# Patient Record
Sex: MALE | Race: WHITE | Employment: OTHER | URBAN - METROPOLITAN AREA
[De-identification: names, ages, dates, MRNs, and addresses within clinical notes are randomized per-mention and may not be internally consistent; named-entity substitution may affect disease eponyms.]

---

## 2019-05-20 ENCOUNTER — HOSPITAL ENCOUNTER (OUTPATIENT)
Dept: PHYSICAL THERAPY | Age: 74
Setting detail: THERAPIES SERIES
Discharge: HOME | End: 2019-05-20
Payer: MEDICARE

## 2019-05-20 PROCEDURE — 97530 THERAPEUTIC ACTIVITIES: CPT

## 2019-05-20 PROCEDURE — 97162 PT EVAL MOD COMPLEX 30 MIN: CPT

## 2019-05-20 ASSESSMENT — PAIN SCALES - QUEBEC BACK PAIN DISABILITY SCALE
TAKE FOOD OUT OF THE REFRIGERATOR: 1
GET OUT OF BED: 2
REACH UP TO HIGH SHELVES: 4
SIT IN A CHAIR FOR SEVERAL HOURS: 1
MOVE A CHAIR: 4
CLIMB ONE FLIGHT OF STAIRS: 4
QUEBEC DISABILITY INDEX: 40-59%
THROW A BALL: 1
BEND OVER TO CLEAN THE BATHTUB: 4
RIDE IN A CAR: 1
PUT ON SOCKS OR PANYHOSE: 2
WALK SEVERAL KILOMETERS  OR MILES: 4
RUN ONE BLOCK OR 100M: 5
QUEBEC CMS MODIFIER: CK
TURN OVER IN BED: 1
TOTAL SCORE: 53
PULL OR PUSH HEAVY DOORS: 1
CARRY TWO BAGS OF GROCERIES: 2
STAND UP FOR 20 TO 30 MINUTES: 4
LIFT AND CARRY A HEAVY SUITCASE: 4
MAKE YOUR BED: 3
WALK A FEW BLOCKS OR 300 TO 400M: 4
SLEEP THROUGH THE NIGHT: 1

## 2019-05-20 NOTE — PLAN OF CARE
Outpatient Physical Therapy  [x] [facility]    Phone: [phone]   Fax: [phone]   [] [facility]  Phone: [phone]              Fax: [phone]  [] [place]   Phone: [phone]   Fax: [phone]     To: Referring Practitioner: Jerome Garcia MD      Patient: Lilly Richards   : 1945   MRN: [id number]  Evaluation Date: 2019      Diagnosis Information:  ·  Dx - Jah LE pain  · Decreased functional mobility 2/2 bilateral LE pain. ·       Physical Therapy Certification  Dear Dr. Maya Deluna  The following patient has been evaluated for physical therapy services and for therapy to continue, Medicare requires monthly physician review of the treatment plan. Please review the attached evaluation and/or summary of the patient's plan of care, and verify that you agree therapy should continue by signing the attached document and sending it back to our office. Plan of Care/Treatment to date:  [] Therapeutic Exercise    [] Modalities:  [] Therapeutic Activity     [] Ultrasound  [] Electrical Stimulation  [] Gait Training      [] Cervical Traction [] Lumbar Traction  [] Neuromuscular Re-education    [] Cold/hotpack [] Iontophoresis   [] Instruction in HEP     Other:  [] Manual Therapy      []             [] Aquatic Therapy      []           ? Frequency/Duration:  # Days per week: [] 1 day # Weeks: [] 1 week [] 5 weeks     [x] 2 days? [] 2 weeks [] 6 weeks     [] 3 days   [] 3 weeks [] 7 weeks     [] 4 days   [] 4 weeks [x] 8 weeks    Rehab Potential: [] Excellent [x] Good [] Fair  [] Poor       Electronically signed by:  Juvenal Mckeon, PT  [id number]    If you have any questions or concerns, please don't hesitate to call.   Thank you for your referral.      Physician Signature:________________________________Date:__________________  By signing above, therapists plan is approved by physician

## 2019-05-20 NOTE — FLOWSHEET NOTE
Program:  Patient instructed in the following for HEP:   Patient verbalized/demonstrated understanding and was issued written HEP. Timed Code Treatment Minutes:  25    Total Treatment Minutes:  40    Treatment/Activity Tolerance:  [x] Patient tolerated treatment well [] Patient limited by fatigue  [] Patient limited by pain  [] Patient limited by other medical complications  [] Other:     Prognosis: [x] Good [] Fair  [] Poor    Goals:    Short term goals  Time Frame for Short term goals: 4 weeks  Short term goal 1: Progress to 45 min of aquatic therapy      Long term goals  Time Frame for Long term goals : Discharge  Long term goal 1: Decrease maximum pain to 7/10 max to allow increased gait tolerance  Long term goal 2:  Increase tandem balance to 10 sec r/l  to improve functional balance with gait     Patient Requires Follow-up: [x] Yes  [] No    Plan:   [] Continue per plan of care [] Alter current plan (see comments)  [x] Plan of care initiated [] Hold pending MD visit [] Discharge    Plan for Next Session:  Aquatic therapy for balance, endurance, gait    Electronically signed by:  Cleotilde Lombard, [address]

## 2019-05-20 NOTE — PROGRESS NOTES
Physical Therapy  Initial Assessment  Date: 2019  Patient Name: Sree Pagan  MRN: [id number]  : 1945     Treatment Diagnosis: Decreased functional mobilty 2/2 bilateral LE pain    Restrictions  Position Activity Restriction  Other position/activity restrictions: medium fall risk    Subjective   General  Chart Reviewed: Yes  Additional Pertinent Hx: CAD, CHF, COPDdepression,  GERD, HLD, HTN, neuropathy, renal insufficiency, sleep apnea, AAA repair, cardiac defibrillator. Referring Practitioner: Makayla Jaramillo MD  Referral Date : 19  General Comment  Comments: MRI 2017 - degenerative changes in lower lumbar spine, mod degenerative arthrosis of both hips. Multilevel degenerative changes most prominent at L4-5 with severe right foraminal stenosis and compression of right L4 nerve root. Bone scan - degenerative uptake of bilateral shoulders, left knee, left midfoot, sternoclavicular joints. PT Visit Information  Onset Date: 09  PT Insurance Information: Medicare  Subjective  Subjective: History of LBP burning and down both legs. Pain 6-10/10. Pain began after  emergent AAA . Vision/Hearing  Vision  Vision: Within Functional Limits  Hearing  Hearing: Exceptions to [facility]  Hearing Exceptions: Hard of hearing/hearing concerns    Orientation  Orientation  Overall Orientation Status: Within Normal Limits    Social/Functional History  Social/Functional History  Lives With: Spouse  Type of Home: House    Objective     Observation/Palpation  Posture: (no LLD, normal lordosis)  Observation: chronic venous stasis changes  Edema: min LE edema noted - Pt notes he gets 4-5 # of fluid off at each HD treatment.     AROM RLE (degrees)  RLE AROM: WFL  AROM LLE (degrees)  LLE AROM : WFL    Strength RLE  Strength RLE: WFL  Strength LLE  Strength LLE: WFL     Additional Measures  Flexibility: mod limited HS/GS flexibility  Special Tests: negative crams  Sensation  Overall Sensation Status: Impaired(decreased LT imani LE's)       Ambulation  Ambulation?: Yes  Ambulation 1  Quality of Gait: antalgic gait with cane, improved deidre with RW  Balance  Standing - Dynamic: Good;-  Tandem Stance R Le  Tandem Stance L Le  Single Leg Stance R Le  Single Leg Stance L Le       Assessment   Conditions Requiring Skilled Therapeutic Intervention  Body structures, Functions, Activity limitations: Decreased functional mobility ; Decreased balance;Decreased endurance;Decreased sensation  Assessment: Pt with 10 year history of bilateral LE pain since emergent AAA repair. Prior function - continued issues over the past ten years due to pain. Current function - limited gait, ADL  Treatment Diagnosis: Decreased functional mobilty 2/2 bilateral LE pain  Prognosis: Good  Decision Making: Medium Complexity  Patient Education: role of PT  Barriers to Learning: none noted  REQUIRES PT FOLLOW UP: Yes  Treatment Initiated : POC  Activity Tolerance  Activity Tolerance: Patient limited by endurance; Patient limited by pain         Plan   Plan  Times per week: 2x/week x 8 weeks  Current Treatment Recommendations: Aquatics, Home Exercise Program, Endurance Training, Functional Mobility Training      OutComes Score  [place] Total Score: 53    Goals  Short term goals  Time Frame for Short term goals: 4 weeks  Short term goal 1: Progress to 45 min of aquatic therapy  Long term goals  Time Frame for Long term goals : Discharge  Long term goal 1: Decrease maximum pain to 7/10 max to allow increased gait tolerance  Long term goal 2: Increase tandem balance to 10 sec r/l  to improve functional balance with gait  Patient Goals   Patient goals : \"To be able to walk long distance. \"        Timed Code Treatment Minutes:   25    Total Treatment Minutes:  [place], [id number]

## 2019-05-23 ENCOUNTER — HOSPITAL ENCOUNTER (OUTPATIENT)
Dept: PHYSICAL THERAPY | Age: 74
Setting detail: THERAPIES SERIES
Discharge: HOME | End: 2019-05-23
Payer: MEDICARE

## 2019-05-23 NOTE — FLOWSHEET NOTE
Physical Therapy  Cancellation/No-show Note  Patient Name:  Alexys Brito  :  1945   Date:  2019  Cancelled visits to date: 1   No-shows to date: 0    For today's appointment patient:  [x]  Cancelled  []  Rescheduled appointment  []  No-show     Reason given by patient:  []  Patient ill  []  Conflicting appointment  []  No transportation    []  Conflict with work  []  No reason given  []  Other:     Comments:  Initial  aquatic session    Electronically signed by:  Laz Lima, PTA  [id number]

## 2019-05-28 ENCOUNTER — HOSPITAL ENCOUNTER (OUTPATIENT)
Dept: PHYSICAL THERAPY | Age: 74
Setting detail: THERAPIES SERIES
Discharge: HOME | End: 2019-05-28
Payer: MEDICARE

## 2019-05-28 PROCEDURE — 97113 AQUATIC THERAPY/EXERCISES: CPT

## 2019-05-28 NOTE — FLOWSHEET NOTE
AROM Extension     LEs exercises:   AROM Side Bending    Marching  10 AROM Rotation    Heel Raises  10 Chin tucks    Toe Raises  10 Chin nods     Squats  10      Hamstring Curls  10      Hip Flexion  10 Balance: Hip Abduction  10 SLS    Hip Circles  10 Tandem stance    TA set   NBOS eyes open    Glut Set  10 NBOS eyes closed    Hip Extension   Hand to Opposite Knee    Hip Adduction    Box Step     Hip IR   Noodle Stance     Hip ER  Stop/Go Gait    Fig 8's  Switch Gait                Seated:  Functional:    Ankle Pumps  10 Step up forward    Ankle circles  10 Step up lateral    Knee flex & ext  10 Step down    Hip Abd & Adduction  10 [place] squats    Bicycle   10 Crate Lifts    Add Set with ball  10 Lunges forward    LX stab with med ball throws  Lunges lateral    Ankle INV  Lunges retro    Ankle EV  Lower ab curl with noodle      Upper ab curl with ball      Med ball straight lifts      Med ball diagonal lifts      Hydrorider          Noodle:      Leg Press  Deep Water:    Noodle hang at wall  Jog    Noodle hang deep water  Jumping Jacks    Noodle Bicycle  Heel to toe      Hand to opposite knee      Cross country skier      Rocking Horse        Individual Aquatic Timed Minutes:  25    Group Aquatic Timed Minutes:  0  Aquatic group therapy is the presence of more than 1 patient in the pool, at the same time. During that time each patient receives individualized instruction designed for their specific diagnosis.      Treatment/Activity Tolerance:  [x] Patient tolerated treatment well [] Patient limited by fatique  [] Patient limited by pain  [] Patient limited by other medical complications  [x] Other: No change in pain after aquatic session    Prognosis: [] Good [] Fair  [] Poor    Patient Requires Follow-up: [] Yes  [] No    Plan:   [x] Continue per plan of care [] Alter current plan (see comments)  [] Plan of care initiated [] Hold pending MD visit [] Discharge    Plan for Next Session:  With emphasis on stabilization, good posture and exercise technique, gradually progress spinal stabilization exercises to increase strength, flexibility and endurance and to decrease pain and improve function.   Add: Assess and progress as tolerated    Electronically signed by: Ness Mckeon, PTA [id number]

## 2019-05-30 ENCOUNTER — HOSPITAL ENCOUNTER (OUTPATIENT)
Dept: PHYSICAL THERAPY | Age: 74
Setting detail: THERAPIES SERIES
Discharge: HOME | End: 2019-05-30
Payer: MEDICARE

## 2019-05-30 PROCEDURE — 97150 GROUP THERAPEUTIC PROCEDURES: CPT

## 2019-05-30 PROCEDURE — 97113 AQUATIC THERAPY/EXERCISES: CPT

## 2019-05-30 NOTE — FLOWSHEET NOTE
Physical Therapy Aquatic Flow Sheet   Date:  2019    Patient Name:  Yvonne Alarcon    :  1945    Restrictions/Precautions:   Medium fall risk  Pertinent Medical History:  CAD, CHF, COPD, depression, GERD, HLD, HTN, neuropathy, renal insufficiency, sleep apnea, AAA repair, cardiac defibrillator, hemodialysis    Diagnosis:  Lumbar / LE pain  Treatment Diagnosis:  Decreased functional mobility 2/2 bilateral LE pain    Insurance/Certification information:  Medicare  Referring Physician:  Alyson Gomez MD  Plan of care signed (Y/N):      Visit# / total visits:  3/16  Pain level: 4-5/10 LBP                          6/10 Legs/neuropathy      Progress Note: []  Yes  [x]  No  Next due by: Visit #10:      History of Injury:   H/o LBP burning and down both legs. Pain -10/10. Limited gait tolerance    Subjective:   No change in leg pain.  LBP was no worse after last session and is slightly better today    Objective:   Observation:  See eval   Test measurements:      Key  B= Belt DB= Dumbells T= Theratube   G=Gloves N= Noodles W= Weights   P= Paddles WW=Water Walker K= Kickboard        Transfers:   steps (SBA)      % Immersion:  75            Ambulation:  Laps Exercises UE:      Forwards  3 + 2 Shoulder Shrugs     Lateral  1 Shoulder circles  10     Marching    Scapular Retraction  10    Retro   Rolling  10    [place]  Push Downs  10     IR/ER  10     Punching     Stretching:  Rowing  10   Gastroc/Soleus  1x76yas Elbow Flex/Ext  10   Hamstring  [id number] Shldr Flex/Ext     Knee flex stretch   Shldr Abd/Add    Piriformis   Horiz Abd/Add     Hip flexor    Arm Circles     SKTC   PNF Diagonals    DKTC  UE oscillations f/b, s/s    ITB   Wall Push-ups    Quad  Figure 8's    Mid back   Buoy pull/push downs    UT  Tband rows    Rhom  Tband lats    Post Shoulder  Lumbar Rot w/ paddles    Pec   Small ball pull downs                   diagonals             Cervical:       AROM Flex       AROM Extension     LEs exercises:   AROM Side Bending    Marching  10 AROM Rotation    Heel Raises  10 Chin tucks    Toe Raises  10 Chin nods     Squats  10      Hamstring Curls  10      Hip Flexion  10 Balance: Hip Abduction  10 SLS    Hip Circles  10 Tandem stance    TA set   NBOS eyes open    Glut Set  10 NBOS eyes closed    Hip Extension   Hand to Opposite Knee    Hip Adduction    Box Step     Hip IR   Noodle Stance     Hip ER  Stop/Go Gait    Fig 8's  Switch Gait                Seated:  Functional:    Ankle Pumps  10 Step up forward    Ankle circles  10 Step up lateral    Knee flex & ext  10 Step down    Hip Abd & Adduction  10 [place] squats    Bicycle   10 Crate Lifts    Add Set with ball  10 Lunges forward    LX stab with med ball throws  Lunges lateral    Ankle INV  Lunges retro    Ankle EV  Lower ab curl with noodle      Upper ab curl with ball      Med ball straight lifts      Med ball diagonal lifts      Hydrorider          Noodle:      Leg Press  Deep Water:    Noodle hang at wall  Jog    Noodle hang deep water  Jumping Jacks    Noodle Bicycle  Heel to toe      Hand to opposite knee      Cross country skier      Rocking Horse        Individual Aquatic Timed Minutes:  20    Group Aquatic Timed Minutes:  15  Aquatic group therapy is the presence of more than 1 patient in the pool, at the same time. During that time each patient receives individualized instruction designed for their specific diagnosis. Treatment/Activity Tolerance:  [x] Patient tolerated treatment well [] Patient limited by fatique  [] Patient limited by pain  [] Patient limited by other medical complications  [x] Other: No change in pain.  Wanted to do more exercises    Prognosis: [] Good [] Fair  [] Poor    Patient Requires Follow-up: [] Yes  [] No    Plan:   [x] Continue per plan of care [] Alter current plan (see comments)  [] Plan of care initiated [] Hold pending MD visit [] Discharge    Plan for Next Session:  With emphasis on stabilization, good posture and exercise technique, gradually progress spinal stabilization exercises to increase strength, flexibility and endurance and to decrease pain and improve function.   Add: Increase UE exercises, balance activities    Electronically signed by: Candice Valadez, PTA [id number]

## 2019-06-04 ENCOUNTER — HOSPITAL ENCOUNTER (OUTPATIENT)
Dept: PHYSICAL THERAPY | Age: 74
Setting detail: THERAPIES SERIES
Discharge: HOME | End: 2019-06-04
Payer: MEDICARE

## 2019-06-06 ENCOUNTER — HOSPITAL ENCOUNTER (OUTPATIENT)
Dept: PHYSICAL THERAPY | Age: 74
Setting detail: THERAPIES SERIES
Discharge: HOME | End: 2019-06-06
Payer: MEDICARE

## 2019-06-06 PROCEDURE — 97113 AQUATIC THERAPY/EXERCISES: CPT

## 2019-06-06 PROCEDURE — 97150 GROUP THERAPEUTIC PROCEDURES: CPT

## 2019-06-06 NOTE — FLOWSHEET NOTE
Physical Therapy Aquatic Flow Sheet   Date:  2019    Patient Name:  Leatha Willard    :  1945    Restrictions/Precautions:   Medium fall risk  Pertinent Medical History:  CAD, CHF, COPD, depression, GERD, HLD, HTN, neuropathy, renal insufficiency, sleep apnea, AAA repair, cardiac defibrillator, hemodialysis    Diagnosis:  Lumbar / LE pain  Treatment Diagnosis:  Decreased functional mobility 2/2 bilateral LE pain    Insurance/Certification information:  Medicare  Referring Physician:  Rosy Foley MD  Plan of care signed (Y/N):      Visit# / total visits:    Pain level: 5/10 LBP                          6/10 Legs/neuropathy      Progress Note: []  Yes  [x]  No  Next due by: Visit #10:      History of Injury:   H/o LBP burning and down both legs. Pain -10/10. Limited gait tolerance    Subjective:    Thinks sitting around has increased his pain    Objective:   Observation:  See eval   Test measurements:      Key  B= Belt DB= Dumbells T= Theratube   G=Gloves N= Noodles W= Weights   P= Paddles WW=Water Walker K= Kickboard        Transfers:   steps (SBA)      % Immersion:  75            Ambulation:  Laps Exercises UE:      Forwards  3 + 2 Shoulder Shrugs     Lateral  1 Shoulder circles  10     Marching    Scapular Retraction  10    Retro   Rolling  10    [place]  Push Downs  10     IR/ER  10     Punching  10   Stretching:  Rowing  10   Gastroc/Soleus  3a74cge Elbow Flex/Ext  10   Hamstring  4p92mox Shldr Flex/Ext  10   Knee flex stretch   Shldr Abd/Add  10   Piriformis   Horiz Abd/Add  10   Hip flexor    Arm Circles  10   SKTC   PNF Diagonals    DKTC  UE oscillations f/b, s/s    ITB   Wall Push-ups    Quad  Figure 8's    Mid back   Buoy pull/push downs    UT  Tband rows    Rhom  Tband lats    Post Shoulder  Lumbar Rot w/ paddles    Pec   Small ball pull downs                   diagonals             Cervical:       AROM Flex       AROM Extension     LEs exercises:   AROM Side Bending    Marching 10 AROM Rotation    Heel Raises  10 Chin tucks    Toe Raises  10 Chin nods     Squats  10      Hamstring Curls  10      Hip Flexion  10 Balance: Hip Abduction  10 SLS    Hip Circles  10 Tandem stance  30sec   TA set   NBOS eyes open  30sec   Glut Set  10 NBOS eyes closed  30sec   Hip Extension   Hand to Opposite Knee    Hip Adduction    Box Step     Hip IR   Noodle Stance     Hip ER  Stop/Go Gait    Fig 8's  Switch Gait                Seated:  Functional:    Ankle Pumps  20 Step up forward    Ankle circles  10 Step up lateral    Knee flex & ext  20 Step down    Hip Abd & Adduction  20 [place] squats    Bicycle   20 Crate Lifts    Add Set with ball  10 Lunges forward    LX stab with med ball throws  Lunges lateral    Ankle INV  Lunges retro    Ankle EV  Lower ab curl with noodle      Upper ab curl with ball      Med ball straight lifts      Med ball diagonal lifts      Hydrorider          Noodle:      Leg Press  Deep Water:    Noodle hang at wall  Jog    Noodle hang deep water  Jumping Jacks    Noodle Bicycle  Heel to toe      Hand to opposite knee      Cross country skier      Rocking Horse        Individual Aquatic Timed Minutes:  20    Group Aquatic Timed Minutes:  25  Aquatic group therapy is the presence of more than 1 patient in the pool, at the same time. During that time each patient receives individualized instruction designed for their specific diagnosis. Treatment/Activity Tolerance:  [x] Patient tolerated treatment well [] Patient limited by fatique  [] Patient limited by pain  [] Patient limited by other medical complications  [x] Other: Felt good in the water.  Russ Morganza to do more exercises    Prognosis: [] Good [] Fair  [] Poor    Patient Requires Follow-up: [] Yes  [] No    Plan:   [x] Continue per plan of care [] Alter current plan (see comments)  [] Plan of care initiated [] Hold pending MD visit [] Discharge    Plan for Next Session:  With emphasis on stabilization, good posture and exercise technique, gradually progress spinal stabilization exercises to increase strength, flexibility and endurance and to decrease pain and improve function.   Add: Increase amb    Electronically signed by: Lynnette Barragan, PTA [id number]

## 2019-06-11 ENCOUNTER — HOSPITAL ENCOUNTER (OUTPATIENT)
Dept: PHYSICAL THERAPY | Age: 74
Setting detail: THERAPIES SERIES
Discharge: HOME | End: 2019-06-11
Payer: MEDICARE

## 2019-06-11 PROCEDURE — 97150 GROUP THERAPEUTIC PROCEDURES: CPT

## 2019-06-11 PROCEDURE — 97113 AQUATIC THERAPY/EXERCISES: CPT

## 2019-06-11 NOTE — FLOWSHEET NOTE
Physical Therapy Aquatic Flow Sheet   Date:  2019    Patient Name:  Maite Floyd    :  1945    Restrictions/Precautions:   Medium fall risk  Pertinent Medical History:  CAD, CHF, COPD, depression, GERD, HLD, HTN, neuropathy, renal insufficiency, sleep apnea, AAA repair, cardiac defibrillator, hemodialysis    Diagnosis:  Lumbar / LE pain  Treatment Diagnosis:  Decreased functional mobility 2/2 bilateral LE pain    Insurance/Certification information:  Medicare  Referring Physician:  Jada Lomeli MD  Plan of care signed (Y/N):  YES     Visit# / total visits:    Pain level: 3-4/10 LBP                          6/10 Legs/neuropathy      Progress Note: []  Yes  [x]  No  Next due by: Visit #10:      History of Injury:   H/o LBP burning and down both legs. Pain -10/10. Limited gait tolerance    Subjective:   LB isn't as bad today. Hasn't really noticed a change in anything, yet, but looks forward to coming to therapy. It is the highlight of his week and mentally he feels better afterwards.      Objective:   Observation:  See eval   Test measurements:      Key  B= Belt DB= Dumbells T= Theratube   G=Gloves N= Noodles W= Weights   P= Paddles WW=Water Walker K= Kickboard        Transfers:   steps (SBA)      % Immersion:  75            Ambulation:  Laps Exercises UE:      Forwards  5+ 2 Shoulder Shrugs     Lateral  2 Shoulder circles  10     Marching    Scapular Retraction  10    Retro   Rolling  10    [place]  Push Downs  10     IR/ER  10     Punching  10   Stretching:  Rowing  10   Gastroc/Soleus  9m26adl Elbow Flex/Ext  10   Hamstring  9n33xwf Shldr Flex/Ext  10   Knee flex stretch   Shldr Abd/Add  10   Piriformis   Horiz Abd/Add  10   Hip flexor    Arm Circles  10   SKTC   PNF Diagonals  10   DKTC  UE oscillations f/b, s/s    ITB   Wall Push-ups    Quad  Figure 8's    Mid back   Buoy pull/push downs    UT  Tband rows    Rhom  Tband lats    Post Shoulder  Lumbar Rot w/ paddles    Pec   Small ball pull downs                   diagonals             Cervical:       AROM Flex       AROM Extension     LEs exercises:   AROM Side Bending    Marching  10 AROM Rotation    Heel Raises  10 Chin tucks    Toe Raises  10 Chin nods     Squats  10      Hamstring Curls  10      Hip Flexion  10 Balance: Hip Abduction  10 SLS    Hip Circles  10 Tandem stance  30sec   TA set   NBOS eyes open  30sec   Glut Set  10 NBOS eyes closed  30sec   Hip Extension   Hand to Opposite Knee    Hip Adduction    Box Step     Hip IR   Noodle Stance     Hip ER  Stop/Go Gait    Fig 8's  Switch Gait                Seated:  Declined Functional:    Ankle Pumps Step up forward    Ankle circles Step up lateral    Knee flex & ext Step down    Hip Abd & Adduction [place] squats    Bicycle  Crate Lifts    Add Set with ball Lunges forward    LX stab with med ball throws  Lunges lateral    Ankle INV  Lunges retro    Ankle EV  Lower ab curl with noodle      Upper ab curl with ball      Med ball straight lifts      Med ball diagonal lifts      Hydrorider          Noodle:      Leg Press  Deep Water:    Noodle hang at wall  Jog    Noodle hang deep water  Jumping Jacks    Noodle Bicycle  Heel to toe      Hand to opposite knee      Cross country skier      Rocking Horse        Individual Aquatic Timed Minutes:  20    Group Aquatic Timed Minutes:  30  Aquatic group therapy is the presence of more than 1 patient in the pool, at the same time. During that time each patient receives individualized instruction designed for their specific diagnosis. Treatment/Activity Tolerance:  [x] Patient tolerated treatment well [] Patient limited by fatique  [] Patient limited by pain  [] Patient limited by other medical complications  [x] Other: Declined seated exercises.  Doesn't know why, but really doesn't like those exercises    Prognosis: [] Good [] Fair  [] Poor    Patient Requires Follow-up: [] Yes  [] No    Plan:   [x] Continue per plan of care [] Rosendo Karimi current plan (see comments)  [] Plan of care initiated [] Hold pending MD visit [] Discharge    Plan for Next Session:  With emphasis on stabilization, good posture and exercise technique, gradually progress spinal stabilization exercises to increase strength, flexibility and endurance and to decrease pain and improve function.   Add:  Leg press, H>K    Electronically signed by: Lynnette Barragan, PTA [id number]

## 2019-06-13 ENCOUNTER — HOSPITAL ENCOUNTER (OUTPATIENT)
Dept: PHYSICAL THERAPY | Age: 74
Setting detail: THERAPIES SERIES
Discharge: HOME | End: 2019-06-13
Payer: MEDICARE

## 2019-06-13 PROCEDURE — 97113 AQUATIC THERAPY/EXERCISES: CPT

## 2019-06-13 PROCEDURE — 97150 GROUP THERAPEUTIC PROCEDURES: CPT

## 2019-06-13 NOTE — FLOWSHEET NOTE
Physical Therapy Aquatic Flow Sheet   Date:  2019    Patient Name:  Gurpreet Cha    :  1945    Restrictions/Precautions:   Medium fall risk  Pertinent Medical History:  CAD, CHF, COPD, depression, GERD, HLD, HTN, neuropathy, renal insufficiency, sleep apnea, AAA repair, cardiac defibrillator, hemodialysis    Diagnosis:  Lumbar / LE pain  Treatment Diagnosis:  Decreased functional mobility 2/2 bilateral LE pain    Insurance/Certification information:  Medicare  Referring Physician:  Humza Mosley MD  Plan of care signed (Y/N):  YES     Visit# / total visits:    Pain level: 5/10 LBP                          6/10 Legs/neuropathy      Progress Note: []  Yes  [x]  No  Next due by: Visit #10:      History of Injury:   H/o LBP burning and down both legs. Pain -10/10. Limited gait tolerance    Subjective:  R LB is bothering him today, but not bad. Declined Tegaderm on his arm today. States it should all be closed up by this time of the day and wants to try without it today.  (Pt informed that that is not something to take a risk with, he still declined)    Objective:   Observation:  See eval   Test measurements:      Key  B= Belt DB= Dumbells T= Theratube   G=Gloves N= Noodles W= Weights   P= Paddles WW=Water Walker K= Kickboard        Transfers:   steps (SBA)      % Immersion:  75            Ambulation:  Laps Exercises UE:      Forwards  5+ 2 Shoulder Shrugs     Lateral  2 Shoulder circles  10     Marching    Scapular Retraction  10    Retro   Rolling  10    [place]ariocas  Push Downs  10     IR/ER  10     Punching  10   Stretching:  Rowing  10   Gastroc/Soleus  2i09xgb Elbow Flex/Ext  10   Hamstring  2x40vzg Shldr Flex/Ext  10   Knee flex stretch   Shldr Abd/Add  10   Piriformis   Horiz Abd/Add  10   Hip flexor    Arm Circles  10   SKTC   PNF Diagonals  10   DKTC  UE oscillations f/b, s/s    ITB   Wall Push-ups    Quad  Figure 8's    Mid back   Buoy pull/push downs    UT  Tband rows    Rhom  Tband lats    Post Shoulder  Lumbar Rot w/ paddles    Pec   Small ball pull downs                   diagonals             Cervical:       AROM Flex       AROM Extension     LEs exercises:   AROM Side Bending    Marching  10 AROM Rotation    Heel Raises  10 Chin tucks    Toe Raises  10 Chin nods     Squats  10      Hamstring Curls  10      Hip Flexion  10 Balance: Hip Abduction  10 SLS    Hip Circles  10 Tandem stance  30sec   TA set   NBOS eyes open  30sec   Glut Set  10 NBOS eyes closed  30sec   Hip Extension   Hand to Opposite Knee  10   Hip Adduction    Box Step     Hip IR   Noodle Stance     Hip ER  Stop/Go Gait    Fig 8's  Switch Gait                Seated:  Declined Functional:    Ankle Pumps Step up forward    Ankle circles Step up lateral    Knee flex & ext Step down    Hip Abd & Adduction [place] squats    Bicycle  Crate Lifts    Add Set with ball Lunges forward    LX stab with med ball throws  Lunges lateral    Ankle INV  Lunges retro    Ankle EV  Lower ab curl with noodle      Upper ab curl with ball      Med ball straight lifts      Med ball diagonal lifts      Hydrorider          Noodle:      Leg Press  10 Deep Water:    Noodle hang at wall  Jog    Noodle hang deep water  Jumping Jacks    Noodle Bicycle  Heel to toe      Hand to opposite knee      Cross country skier      Rocking Horse        Individual Aquatic Timed Minutes:  20    Group Aquatic Timed Minutes:  25  Aquatic group therapy is the presence of more than 1 patient in the pool, at the same time. During that time each patient receives individualized instruction designed for their specific diagnosis. Treatment/Activity Tolerance:  [x] Patient tolerated treatment well [] Patient limited by fatique  [] Patient limited by pain  [] Patient limited by other medical complications  [x] Other: Declined seated exercises again. Cues required for posture and reps with exercises.  Pt with c/o fatigue after exercises today    Prognosis: [] Good [] Fair  [] Poor    Patient Requires Follow-up: [] Yes  [] No    Plan:   [x] Continue per plan of care [] Alter current plan (see comments)  [] Plan of care initiated [] Hold pending MD visit [] Discharge    Plan for Next Session:  With emphasis on stabilization, good posture and exercise technique, gradually progress spinal stabilization exercises to increase strength, flexibility and endurance and to decrease pain and improve function.   Add:  marching    Electronically signed by: Araseli Tabor, PTA [id number]

## 2019-06-18 ENCOUNTER — HOSPITAL ENCOUNTER (OUTPATIENT)
Dept: PHYSICAL THERAPY | Age: 74
Setting detail: THERAPIES SERIES
End: 2019-06-18
Payer: MEDICARE

## 2019-06-18 ENCOUNTER — HOSPITAL ENCOUNTER (OUTPATIENT)
Dept: PHYSICAL THERAPY | Age: 74
Setting detail: THERAPIES SERIES
Discharge: HOME | End: 2019-06-18
Payer: MEDICARE

## 2019-06-18 NOTE — FLOWSHEET NOTE
Physical Therapy  Cancellation - not feeling well after dialysis  Patient Name:  Maite Floyd  :  1945   Date:  2019  Cancelled visits to date: 3  No-shows to date: 0    For today's appointment patient:  [x]  Cancelled  []  Rescheduled appointment  []  No-show     Reason given by patient:  []  Patient ill  []  Conflicting appointment  []  No transportation    []  Conflict with work  []  No reason given  []  Other:     Comments:    Electronically signed by:  Ethan Guzman, [facility]

## 2019-06-20 ENCOUNTER — HOSPITAL ENCOUNTER (OUTPATIENT)
Dept: PHYSICAL THERAPY | Age: 74
Setting detail: THERAPIES SERIES
Discharge: HOME | End: 2019-06-20
Payer: MEDICARE

## 2019-06-20 PROCEDURE — 97150 GROUP THERAPEUTIC PROCEDURES: CPT

## 2019-06-20 PROCEDURE — 97113 AQUATIC THERAPY/EXERCISES: CPT

## 2019-06-20 NOTE — FLOWSHEET NOTE
Physical Therapy Aquatic Flow Sheet   Date:  2019    Patient Name:  Luís Morocho    :  1945    Restrictions/Precautions:   Medium fall risk  Pertinent Medical History:  CAD, CHF, COPD, depression, GERD, HLD, HTN, neuropathy, renal insufficiency, sleep apnea, AAA repair, cardiac defibrillator, hemodialysis    Diagnosis:  Lumbar / LE pain  Treatment Diagnosis:  Decreased functional mobility 2/2 bilateral LE pain    Insurance/Certification information:  Medicare  Referring Physician:  Lorrie Bolton MD  Plan of care signed (Y/N):  YES     Visit# / total visits:    Pain level: 5-6/10 LBP                          6/10 Legs/neuropathy      Progress Note: []  Yes  [x]  No  Next due by: Visit #10:      History of Injury:   H/o LBP burning and down both legs. Pain -10/10. Limited gait tolerance    Subjective: Going in for another epidural in a week or 2.    Objective:   Observation:  See eval   Test measurements:      Key  B= Belt DB= Dumbells T= Theratube   G=Gloves N= Noodles W= Weights   P= Paddles WW=Water Walker K= Kickboard        Transfers:   steps (SBA)      % Immersion:  75            Ambulation:  Laps Exercises UE:      Forwards  5+ 2 Shoulder Shrugs     Lateral  2 Shoulder circles  10     Marching  1 Scapular Retraction  10    Retro   Rolling  10    Cariocas  Push Downs  10     IR/ER  10     Punching  10   Stretching:  Rowing  10   Gastroc/Soleus  [id number] Elbow Flex/Ext  10   Hamstring  1k36hrc Shldr Flex/Ext  10   Knee flex stretch   Shldr Abd/Add  10   Piriformis   Horiz Abd/Add  10   Hip flexor    Arm Circles  10   SKTC   PNF Diagonals  10   DKTC  UE oscillations f/b, s/s    ITB   Wall Push-ups    Quad  Figure 8's    Mid back   Buoy pull/push downs    UT  Tband rows    Rhom  Tband lats    Post Shoulder  Lumbar Rot w/ paddles    Pec   Small ball pull downs                   diagonals             Cervical:       AROM Flex       AROM Extension     LEs exercises:   AROM Side Bending Marching  10 AROM Rotation    Heel Raises  10 Chin tucks    Toe Raises  10 Chin nods     Squats  10      Hamstring Curls  10      Hip Flexion  10 Balance: Hip Abduction  10 SLS    Hip Circles  10 Tandem stance  30sec   TA set   NBOS eyes open  30sec   Glut Set  10 NBOS eyes closed  30sec   Hip Extension   Hand to Opposite Knee  10   Hip Adduction    Box Step     Hip IR   Noodle Stance     Hip ER  Stop/Go Gait    Fig 8's  Switch Gait                Seated:  Declined Functional:    Ankle Pumps Step up forward    Ankle circles Step up lateral    Knee flex & ext Step down    Hip Abd & Adduction [place] squats    Bicycle  Crate Lifts    Add Set with ball Lunges forward    LX stab with med ball throws  Lunges lateral    Ankle INV  Lunges retro    Ankle EV  Lower ab curl with noodle      Upper ab curl with ball      Med ball straight lifts      Med ball diagonal lifts      Hydrorider          Noodle:      Leg Press  10 Deep Water:    Noodle hang at wall  Jog    Noodle hang deep water  Jumping Jacks    Noodle Bicycle  Heel to toe      Hand to opposite knee      Cross country skier      Rocking Horse        Individual Aquatic Timed Minutes:  20    Group Aquatic Timed Minutes:  30  Aquatic group therapy is the presence of more than 1 patient in the pool, at the same time. During that time each patient receives individualized instruction designed for their specific diagnosis. Treatment/Activity Tolerance:  [x] Patient tolerated treatment well [] Patient limited by fatique  [] Patient limited by pain  [] Patient limited by other medical complications  [x] Other: Declined seated exercises. Cues required for posture and reps with exercises.      Prognosis: [] Good [] Fair  [] Poor    Patient Requires Follow-up: [] Yes  [] No    Plan:   [x] Continue per plan of care [] Alter current plan (see comments)  [] Plan of care initiated [] Hold pending MD visit [] Discharge    Plan for Next Session:  With emphasis on stabilization, good posture and exercise technique, gradually progress spinal stabilization exercises to increase strength, flexibility and endurance and to decrease pain and improve function.   Add: Step ups    Electronically signed by: Billy Torres PTA [id number]

## 2019-06-25 ENCOUNTER — HOSPITAL ENCOUNTER (OUTPATIENT)
Dept: PHYSICAL THERAPY | Age: 74
Setting detail: THERAPIES SERIES
Discharge: HOME | End: 2019-06-25
Payer: MEDICARE

## 2019-06-25 PROCEDURE — 97113 AQUATIC THERAPY/EXERCISES: CPT

## 2019-06-25 PROCEDURE — 97150 GROUP THERAPEUTIC PROCEDURES: CPT

## 2019-06-25 NOTE — FLOWSHEET NOTE
Physical Therapy Aquatic Flow Sheet   Date:  2019    Patient Name:  Gurpreet Cha    :  1945    Restrictions/Precautions:   Medium fall risk  Pertinent Medical History:  CAD, CHF, COPD, depression, GERD, HLD, HTN, neuropathy, renal insufficiency, sleep apnea, AAA repair, cardiac defibrillator, hemodialysis    Diagnosis:  Lumbar / LE pain  Treatment Diagnosis:  Decreased functional mobility 2/2 bilateral LE pain    Insurance/Certification information:  Medicare  Referring Physician:  Humza Mosley MD  Plan of care signed (Y/N):  YES     Visit# / total visits:    Pain level: 4-5/10 LBP                          6/10 Legs/neuropathy      Progress Note: []  Yes  [x]  No  Next due by: Visit #10:      History of Injury:   H/o LBP burning and down both legs. Pain -10/10.  Limited gait tolerance    Subjective:   Feels like he is walking straighter and balancing better since starting therapy  Objective:   Observation:  See eval   Test measurements:      Key  B= Belt DB= Dumbells T= Theratube   G=Gloves N= Noodles W= Weights   P= Paddles WW=Water Walker K= Kickboard        Transfers:   steps (SBA)      % Immersion:  75            Ambulation:  Laps Exercises UE:      Forwards  5+ 2 Shoulder Shrugs     Lateral  2 Shoulder circles  10     Marching  2 Scapular Retraction  10    Retro   Rolling  10    Cariocas  Push Downs  10     IR/ER  10     Punching  10   Stretching:  Rowing  10   Gastroc/Soleus  [id number] Elbow Flex/Ext  10   Hamstring  8k79gdv Shldr Flex/Ext  10   Knee flex stretch   Shldr Abd/Add  10   Piriformis   Horiz Abd/Add  10   Hip flexor    Arm Circles  10   SKTC   PNF Diagonals  10   DKTC  UE oscillations f/b, s/s    ITB   Wall Push-ups    Quad  Figure 8's    Mid back   Buoy pull/push downs    UT  Tband rows    Rhom  Tband lats    Post Shoulder  Lumbar Rot w/ paddles    Pec   Small ball pull downs                   diagonals             Cervical:       AROM Flex       AROM Extension     LEs exercises:   AROM Side Bending    Marching  10 AROM Rotation    Heel Raises  10 Chin tucks    Toe Raises  10 Chin nods     Squats  10      Hamstring Curls  10      Hip Flexion  10 Balance: Hip Abduction  10 SLS    Hip Circles  10 Tandem stance  30sec   TA set   NBOS eyes open  30sec   Glut Set  10 NBOS eyes closed  30sec   Hip Extension   Hand to Opposite Knee  10   Hip Adduction    Box Step     Hip IR   Noodle Stance     Hip ER  Stop/Go Gait    Fig 8's  Switch Gait                Seated:  Declined Functional:    Ankle Pumps Step up forward  10   Ankle circles Step up lateral    Knee flex & ext Step down    Hip Abd & Adduction [place] squats    Bicycle  Crate Lifts    Add Set with ball Lunges forward    LX stab with med ball throws  Lunges lateral    Ankle INV  Lunges retro    Ankle EV  Lower ab curl with noodle      Upper ab curl with ball      Med ball straight lifts      Med ball diagonal lifts      Hydrorider          Noodle:      Leg Press  10 Deep Water:    Noodle hang at wall  Jog    Noodle hang deep water  Jumping Jacks    Noodle Bicycle  Heel to toe      Hand to opposite knee      Cross country skier      Rocking Horse        Individual Aquatic Timed Minutes:  20    Group Aquatic Timed Minutes:  30  Aquatic group therapy is the presence of more than 1 patient in the pool, at the same time. During that time each patient receives individualized instruction designed for their specific diagnosis.      Treatment/Activity Tolerance:  [x] Patient tolerated treatment well [] Patient limited by fatique  [] Patient limited by pain  [] Patient limited by other medical complications  [x] Other: Cues required for posture and reps    Prognosis: [] Good [] Fair  [] Poor    Patient Requires Follow-up: [] Yes  [] No    Plan:   [x] Continue per plan of care [] Alter current plan (see comments)  [] Plan of care initiated [] Hold pending MD visit [] Discharge    Plan for Next Session:  With emphasis on stabilization, good posture and exercise technique, gradually progress spinal stabilization exercises to increase strength, flexibility and endurance and to decrease pain and improve function.   Add: lateral step ups    Electronically signed by: Janiya Barry, PTA [id number]

## 2019-06-27 ENCOUNTER — HOSPITAL ENCOUNTER (OUTPATIENT)
Dept: PHYSICAL THERAPY | Age: 74
Setting detail: THERAPIES SERIES
Discharge: HOME | End: 2019-06-27
Payer: MEDICARE

## 2019-06-27 PROCEDURE — 97150 GROUP THERAPEUTIC PROCEDURES: CPT

## 2019-06-27 PROCEDURE — 97113 AQUATIC THERAPY/EXERCISES: CPT

## 2019-06-27 NOTE — FLOWSHEET NOTE
posture and exercise technique, gradually progress spinal stabilization exercises to increase strength, flexibility and endurance and to decrease pain and improve function.   Add:  Increase amb    Electronically signed by: Araseli Tabor, PTA [id number]

## 2019-07-02 ENCOUNTER — HOSPITAL ENCOUNTER (OUTPATIENT)
Dept: PHYSICAL THERAPY | Age: 74
Setting detail: THERAPIES SERIES
Discharge: HOME | End: 2019-07-02
Payer: MEDICARE

## 2019-07-02 PROCEDURE — 97530 THERAPEUTIC ACTIVITIES: CPT

## 2019-07-02 ASSESSMENT — PAIN SCALES - QUEBEC BACK PAIN DISABILITY SCALE
SIT IN A CHAIR FOR SEVERAL HOURS: 0
QUEBEC CMS MODIFIER: CK
THROW A BALL: 1
CLIMB ONE FLIGHT OF STAIRS: 4
MAKE YOUR BED: 3
GET OUT OF BED: 2
WALK SEVERAL KILOMETERS  OR MILES: 4
STAND UP FOR 20 TO 30 MINUTES: 3
RIDE IN A CAR: 1
WALK A FEW BLOCKS OR 300 TO 400M: 4
PUT ON SOCKS OR PANYHOSE: 0
TAKE FOOD OUT OF THE REFRIGERATOR: 0
TOTAL SCORE: 45
PULL OR PUSH HEAVY DOORS: 1
QUEBEC DISABILITY INDEX: 40-59%
MOVE A CHAIR: 3
REACH UP TO HIGH SHELVES: 3
TURN OVER IN BED: 1
SLEEP THROUGH THE NIGHT: 1
BEND OVER TO CLEAN THE BATHTUB: 3
CARRY TWO BAGS OF GROCERIES: 2
RUN ONE BLOCK OR 100M: 5
LIFT AND CARRY A HEAVY SUITCASE: 4

## 2019-07-11 ENCOUNTER — HOSPITAL ENCOUNTER (OUTPATIENT)
Dept: PHYSICAL THERAPY | Age: 74
Setting detail: THERAPIES SERIES
Discharge: HOME | End: 2019-07-11
Payer: MEDICARE

## 2019-07-11 PROCEDURE — 97113 AQUATIC THERAPY/EXERCISES: CPT

## 2019-07-11 PROCEDURE — 97150 GROUP THERAPEUTIC PROCEDURES: CPT

## 2019-07-16 ENCOUNTER — HOSPITAL ENCOUNTER (OUTPATIENT)
Dept: PHYSICAL THERAPY | Age: 74
Setting detail: THERAPIES SERIES
Discharge: HOME | End: 2019-07-16
Payer: MEDICARE

## 2019-07-18 ENCOUNTER — HOSPITAL ENCOUNTER (OUTPATIENT)
Dept: PHYSICAL THERAPY | Age: 74
Setting detail: THERAPIES SERIES
Discharge: HOME | End: 2019-07-18
Payer: MEDICARE

## 2019-07-18 PROCEDURE — 97113 AQUATIC THERAPY/EXERCISES: CPT

## 2019-07-18 PROCEDURE — 97150 GROUP THERAPEUTIC PROCEDURES: CPT

## 2019-07-18 NOTE — FLOWSHEET NOTE
Yes  [] No    Plan:   [x] Continue per plan of care [] Alter current plan (see comments)  [] Plan of care initiated [] Hold pending MD visit [] Discharge    Plan for Next Session:  With emphasis on stabilization, good posture and exercise technique, gradually progress spinal stabilization exercises to increase strength, flexibility and endurance and to decrease pain and improve function.   Add: Resume leg press and lateral step ups    Electronically signed by: Twila Hightower, PTA [id number]

## 2019-07-23 ENCOUNTER — HOSPITAL ENCOUNTER (OUTPATIENT)
Dept: PHYSICAL THERAPY | Age: 74
Setting detail: THERAPIES SERIES
Discharge: HOME | End: 2019-07-23
Payer: MEDICARE

## 2019-07-23 NOTE — FLOWSHEET NOTE
Physical Therapy  Cancellation - not feeling well after dialysis  Patient Name:  Serena Santos  :  1945   Date:  2019  Cancelled visits to date: 5  No-shows to date: 1    For today's appointment patient:  [x]  Cancelled  []  Rescheduled appointment  []  No-show     Reason given by patient:  []  Patient ill  []  Conflicting appointment  []  No transportation    []  Conflict with work  []  No reason given  [x]  Other:     Comments: Not feeling well due to dialysis    Electronically signed by:  Jennifer Graham,

## 2019-07-30 ENCOUNTER — HOSPITAL ENCOUNTER (OUTPATIENT)
Dept: PHYSICAL THERAPY | Age: 74
Setting detail: THERAPIES SERIES
Discharge: HOME | End: 2019-07-30
Payer: MEDICARE

## 2019-08-01 ENCOUNTER — HOSPITAL ENCOUNTER (OUTPATIENT)
Dept: PHYSICAL THERAPY | Age: 74
Setting detail: THERAPIES SERIES
Discharge: HOME | End: 2019-08-01
Payer: MEDICARE

## 2020-01-13 ENCOUNTER — OFFICE VISIT (OUTPATIENT)
Dept: SURGERY | Age: 75
End: 2020-01-13
Payer: MEDICARE

## 2020-01-13 VITALS
SYSTOLIC BLOOD PRESSURE: 130 MMHG | HEART RATE: 93 BPM | WEIGHT: 250 LBS | DIASTOLIC BLOOD PRESSURE: 65 MMHG | BODY MASS INDEX: 33.91 KG/M2

## 2020-01-13 DIAGNOSIS — I70.235 ATHEROSCLEROSIS OF NATIVE ARTERY OF RIGHT LOWER EXTREMITY WITH ULCERATION OF OTHER PART OF FOOT (HCC): ICD-10-CM

## 2020-01-13 LAB
HCT VFR BLD CALC: 38.3 % (ref 40.5–52.5)
HEMOGLOBIN: 12.7 G/DL (ref 13.5–17.5)
MCH RBC QN AUTO: 31.3 PG (ref 26–34)
MCHC RBC AUTO-ENTMCNC: 33.3 G/DL (ref 31–36)
MCV RBC AUTO: 94 FL (ref 80–100)
PDW BLD-RTO: 16.5 % (ref 12.4–15.4)
PLATELET # BLD: 170 K/UL (ref 135–450)
PMV BLD AUTO: 7.5 FL (ref 5–10.5)
RBC # BLD: 4.07 M/UL (ref 4.2–5.9)
REASON FOR REJECTION: NORMAL
REJECTED TEST: NORMAL
WBC # BLD: 11 K/UL (ref 4–11)

## 2020-01-13 PROCEDURE — G8417 CALC BMI ABV UP PARAM F/U: HCPCS | Performed by: NURSE PRACTITIONER

## 2020-01-13 PROCEDURE — 4040F PNEUMOC VAC/ADMIN/RCVD: CPT | Performed by: NURSE PRACTITIONER

## 2020-01-13 PROCEDURE — 99214 OFFICE O/P EST MOD 30 MIN: CPT | Performed by: NURSE PRACTITIONER

## 2020-01-13 PROCEDURE — G8484 FLU IMMUNIZE NO ADMIN: HCPCS | Performed by: NURSE PRACTITIONER

## 2020-01-13 PROCEDURE — 1123F ACP DISCUSS/DSCN MKR DOCD: CPT | Performed by: NURSE PRACTITIONER

## 2020-01-13 PROCEDURE — 93922 UPR/L XTREMITY ART 2 LEVELS: CPT | Performed by: NURSE PRACTITIONER

## 2020-01-13 PROCEDURE — G8427 DOCREV CUR MEDS BY ELIG CLIN: HCPCS | Performed by: NURSE PRACTITIONER

## 2020-01-13 PROCEDURE — 3017F COLORECTAL CA SCREEN DOC REV: CPT | Performed by: NURSE PRACTITIONER

## 2020-01-13 PROCEDURE — 4004F PT TOBACCO SCREEN RCVD TLK: CPT | Performed by: NURSE PRACTITIONER

## 2020-01-13 NOTE — PROGRESS NOTES
DULoxetine (CYMBALTA) 30 MG capsule nightly  Yes Historical Provider, MD   furosemide (LASIX) 20 MG tablet Take 20 mg by mouth 2 times daily  Yes Historical Provider, MD   isosorbide mononitrate (IMDUR) 30 MG CR tablet Take by mouth nightly  Yes Historical Provider, MD   aspirin 81 MG tablet Take 81 mg by mouth daily Yes Historical Provider, MD       History reviewed. Objective:   Physical Exam  Vitals signs reviewed. Constitutional:       General: He is not in acute distress. Appearance: Normal appearance. He is well-developed. HENT:      Head: Normocephalic. Right Ear: Hearing normal.      Left Ear: Hearing normal.   Eyes:      General: Lids are normal.      Conjunctiva/sclera: Conjunctivae normal.      Pupils: Pupils are equal, round, and reactive to light. Neck:      Musculoskeletal: Normal range of motion and neck supple. Vascular: No carotid bruit. Trachea: Trachea normal. No tracheal deviation. Cardiovascular:      Rate and Rhythm: Normal rate. Rhythm irregularly irregular. Pulses:           Popliteal pulses are 0 on the right side and 0 on the left side. Dorsalis pedis pulses are 0 on the right side and 0 on the left side. Posterior tibial pulses are 0 on the right side and 1+ on the left side. Heart sounds: Normal heart sounds. No murmur. No friction rub. No gallop. Comments:   RENETTA'S:  Right:  P.T.: 46 D.P.: 69 ARM BP: 130       P. I.: 0.39/0.53  Left:  P.T.: 129 D.P.: 63 ARM BP: 130       P. I.: 0.99/0.48    Left upper arm fistula  Pulmonary:      Effort: Pulmonary effort is normal. No respiratory distress. Breath sounds: Normal breath sounds. No wheezing. Abdominal:      General: Bowel sounds are normal. There is no distension. Palpations: Abdomen is soft. Abdomen is not rigid. There is no mass. Tenderness: There is no tenderness. There is no guarding or rebound. Musculoskeletal: Normal range of motion.    Skin:     General: Skin is warm and dry. Findings: No erythema or rash. Neurological:      Mental Status: He is alert and oriented to person, place, and time. Psychiatric:         Speech: Speech normal.         Behavior: Behavior normal. Behavior is cooperative. Thought Content: Thought content normal.         Assessment:         Diagnosis Orders   1. Atherosclerosis of native artery of right lower extremity with ulceration of other part of foot (HCC)  CBC  Topical PSO and gauze dressing    US RENETTA CHELA LIMITED 1-2 LEVELS   2. Right second toe ulcer, with fat layer exposed ([address]  Under the care of Dr. Adia Nieves   3. ESRD on hemodialysis [place], [place]   4. Chronic systolic heart failure (HCC)  On Digoxin and metoprolol       RENETTA's were done today; right RENETTA 0.39/0.53  Left 0.99/0.48       PATIENT EDUCATION focused on angiogram instructions:  Notify us before the procedure if you are allergic to contrast dye, shell fish, iodine, nickel or any type of jewelry. THIS IS VERY IMPORTANT  Do not eat or drink anything containing CAFFEINE for at least 24 hours prior to your procedure. Do not eat or drink anything after midnight (or for 8 hours) prior to the procedure. Take your morning medication with a small amount of water at your normally scheduled time, including blood pressure pills, Plavix and Aspirin. HOLD vitamins and supplements. If you are on Coumadin, Warfarin, Jantoven, Xarelto, Pradaxa or Eliquis. Please hold these 3 days prior to this procedure. If you are on METFORMIN, HOLD it the day before and the day of your procedure. If you are DIABETIC or take INSULIN, you may take a 1/2 dose the night before. Please HOLD all Diabetic medications on the day of your procedure. DO NOT TAKE any diuretics (water pills) such as Lasix, Bumex, Demadex, furosemide, or Zaroxolyn on the day of your procedure.   *If your diuretic is combined with your blood pressure medication, you will take it as

## 2020-01-13 NOTE — PATIENT INSTRUCTIONS
PATIENT EDUCATION focused on angiogram instructions:  Notify us before the procedure if you are allergic to contrast dye, shell fish, iodine, nickel or any type of jewelry. THIS IS VERY IMPORTANT  Do not eat or drink anything containing CAFFEINE for at least 24 hours prior to your procedure. Do not eat or drink anything after midnight (or for 8 hours) prior to the procedure. Take your morning medication with a small amount of water at your normally scheduled time, including blood pressure pills, Plavix and Aspirin. HOLD vitamins and supplements. If you are on Coumadin, Warfarin, Jantoven, Xarelto, Pradaxa or Eliquis. Please hold these 3 days prior to this procedure. If you are on METFORMIN, HOLD it the day before and the day of your procedure. If you are DIABETIC or take INSULIN, you may take a 1/2 dose the night before. Please HOLD all Diabetic medications on the day of your procedure. DO NOT TAKE any diuretics (water pills) such as Lasix, Bumex, Demadex, furosemide, or Zaroxolyn on the day of your procedure. *If your diuretic is combined with your blood pressure medication, you will take it as usual.  You MUST have someone to drive you home - you are not allowed to drive for 24 hours after your procedure. Someone needs to stay with you the night of your procedure if you go home. You are not allowed to do any heavy lifting (over 15 pounds - a gallon of milk is 7 lbs.) for at least one week after your procedure. No squatting or crouching for at least one week after your procedure. Additional discharge instructions will be given to you at the time of your discharge.

## 2020-01-14 ENCOUNTER — TELEPHONE (OUTPATIENT)
Dept: SURGERY | Age: 75
End: 2020-01-14

## 2020-01-14 PROBLEM — I50.22 CHRONIC SYSTOLIC HEART FAILURE (HCC): Status: ACTIVE | Noted: 2020-01-14

## 2020-01-14 PROBLEM — I70.239 ATHEROSCLEROSIS OF NATIVE ARTERY OF RIGHT LOWER EXTREMITY WITH ULCERATION (HCC): Status: ACTIVE | Noted: 2020-01-14

## 2020-01-14 PROBLEM — Z99.2 ESRD ON HEMODIALYSIS (HCC): Status: ACTIVE | Noted: 2020-01-14

## 2020-01-14 PROBLEM — L97.512 RIGHT SECOND TOE ULCER, WITH FAT LAYER EXPOSED (HCC): Status: ACTIVE | Noted: 2020-01-14

## 2020-01-14 PROBLEM — N18.6 ESRD ON HEMODIALYSIS (HCC): Status: ACTIVE | Noted: 2020-01-14

## 2020-01-15 DIAGNOSIS — I70.235 ATHEROSCLEROSIS OF NATIVE ARTERY OF RIGHT LOWER EXTREMITY WITH ULCERATION OF OTHER PART OF FOOT (HCC): ICD-10-CM

## 2020-01-15 LAB
ANION GAP SERPL CALCULATED.3IONS-SCNC: 13 MMOL/L (ref 3–16)
BUN BLDV-MCNC: 16 MG/DL (ref 7–20)
CALCIUM SERPL-MCNC: 9 MG/DL (ref 8.3–10.6)
CHLORIDE BLD-SCNC: 95 MMOL/L (ref 99–110)
CO2: 30 MMOL/L (ref 21–32)
CREAT SERPL-MCNC: 3.5 MG/DL (ref 0.8–1.3)
GFR AFRICAN AMERICAN: 21
GFR NON-AFRICAN AMERICAN: 17
GLUCOSE BLD-MCNC: 97 MG/DL (ref 70–99)
POTASSIUM SERPL-SCNC: 3.9 MMOL/L (ref 3.5–5.1)
SODIUM BLD-SCNC: 138 MMOL/L (ref 136–145)

## 2020-01-17 ENCOUNTER — TELEPHONE (OUTPATIENT)
Dept: SURGERY | Age: 75
End: 2020-01-17

## 2020-01-21 NOTE — TELEPHONE ENCOUNTER
Dr. Ciara Blake would like patient to get formal RENETTA's on 1/29. After he receives the results he will decide if and when patient needs Angiogram. Patient's wife advised and she understands plan.  Please contact and discuss results of RENETTA's with Dr. Ciara Blake when available

## 2020-01-22 ENCOUNTER — PREP FOR PROCEDURE (OUTPATIENT)
Dept: VASCULAR SURGERY | Age: 75
End: 2020-01-22

## 2020-01-22 RX ORDER — SODIUM CHLORIDE 0.9 % (FLUSH) 0.9 %
10 SYRINGE (ML) INJECTION EVERY 12 HOURS SCHEDULED
Status: CANCELLED | OUTPATIENT
Start: 2020-01-22

## 2020-01-22 RX ORDER — SODIUM CHLORIDE 9 MG/ML
INJECTION, SOLUTION INTRAVENOUS CONTINUOUS
Status: CANCELLED | OUTPATIENT
Start: 2020-01-22

## 2020-01-22 RX ORDER — ALPRAZOLAM 0.25 MG/1
0.5 TABLET ORAL
Status: CANCELLED | OUTPATIENT
Start: 2020-01-22 | End: 2020-01-22

## 2020-01-22 RX ORDER — SODIUM CHLORIDE 0.9 % (FLUSH) 0.9 %
10 SYRINGE (ML) INJECTION PRN
Status: CANCELLED | OUTPATIENT
Start: 2020-01-22

## 2020-01-22 NOTE — TELEPHONE ENCOUNTER
Spoke with patient and his wife regarding angiogram.  I explained that we did RENETTA's in our office so they are not needed. Sammi Richey will schedule patient for angio at [facility]. I was told by our vascular tech, Verta Sumiton, that he would be scheduled on Monday, January 27, 2020.

## 2020-01-27 ENCOUNTER — HOSPITAL ENCOUNTER (OUTPATIENT)
Dept: CARDIAC CATH/INVASIVE PROCEDURES | Age: 75
Discharge: HOME | End: 2020-01-27
Attending: SURGERY | Admitting: SURGERY
Payer: MEDICARE

## 2020-01-27 VITALS
OXYGEN SATURATION: 99 % | SYSTOLIC BLOOD PRESSURE: 116 MMHG | DIASTOLIC BLOOD PRESSURE: 63 MMHG | HEIGHT: 72 IN | BODY MASS INDEX: 33.18 KG/M2 | WEIGHT: 245 LBS

## 2020-01-27 PROBLEM — I70.235 ATHEROSCLEROSIS OF NATIVE ARTERIES OF RIGHT LEG WITH ULCERATION OF OTHER PART OF FOOT (HCC): Status: ACTIVE | Noted: 2020-01-14

## 2020-01-27 LAB
POC ACT LR: 153 SEC
POC ACT LR: 207 SEC
POC ACT LR: 304 SEC

## 2020-01-27 PROCEDURE — 76937 US GUIDE VASCULAR ACCESS: CPT | Performed by: SURGERY

## 2020-01-27 PROCEDURE — 99152 MOD SED SAME PHYS/QHP 5/>YRS: CPT

## 2020-01-27 PROCEDURE — 99153 MOD SED SAME PHYS/QHP EA: CPT

## 2020-01-27 PROCEDURE — 75716 ARTERY X-RAYS ARMS/LEGS: CPT

## 2020-01-27 PROCEDURE — 75716 ARTERY X-RAYS ARMS/LEGS: CPT | Performed by: SURGERY

## 2020-01-27 PROCEDURE — C1769 GUIDE WIRE: HCPCS

## 2020-01-27 PROCEDURE — C1724 CATH, TRANS ATHEREC,ROTATION: HCPCS

## 2020-01-27 PROCEDURE — 2709999900 HC NON-CHARGEABLE SUPPLY

## 2020-01-27 PROCEDURE — 6370000000 HC RX 637 (ALT 250 FOR IP)

## 2020-01-27 PROCEDURE — 85347 COAGULATION TIME ACTIVATED: CPT

## 2020-01-27 PROCEDURE — C1894 INTRO/SHEATH, NON-LASER: HCPCS

## 2020-01-27 PROCEDURE — C1725 CATH, TRANSLUMIN NON-LASER: HCPCS

## 2020-01-27 PROCEDURE — 6360000002 HC RX W HCPCS

## 2020-01-27 PROCEDURE — C1887 CATHETER, GUIDING: HCPCS

## 2020-01-27 PROCEDURE — 37229 PR REVSC OPN/PRQ TIB/PERO W/ATHRC/ANGIOP SM VSL: CPT | Performed by: SURGERY

## 2020-01-27 PROCEDURE — 75774 ARTERY X-RAY EACH VESSEL: CPT | Performed by: SURGERY

## 2020-01-27 PROCEDURE — 75774 ARTERY X-RAY EACH VESSEL: CPT

## 2020-01-27 PROCEDURE — 37229 HC TIB PER TERRITORY ATHER: CPT

## 2020-01-27 PROCEDURE — 2500000003 HC RX 250 WO HCPCS

## 2020-01-27 PROCEDURE — 6360000004 HC RX CONTRAST MEDICATION: Performed by: SURGERY

## 2020-01-27 PROCEDURE — 75625 CONTRAST EXAM ABDOMINL AORTA: CPT | Performed by: SURGERY

## 2020-01-27 PROCEDURE — 75625 CONTRAST EXAM ABDOMINL AORTA: CPT

## 2020-01-27 RX ORDER — DOXYCYCLINE HYCLATE 100 MG/1
100 CAPSULE ORAL DAILY
COMMUNITY

## 2020-01-27 RX ORDER — OXYCODONE HYDROCHLORIDE AND ACETAMINOPHEN 5; 325 MG/1; MG/1
1 TABLET ORAL 2 TIMES DAILY
COMMUNITY

## 2020-01-27 RX ORDER — CLOPIDOGREL BISULFATE 75 MG/1
75 TABLET ORAL DAILY
Qty: 30 TABLET | Refills: 3 | Status: SHIPPED | OUTPATIENT
Start: 2020-01-27

## 2020-01-27 RX ORDER — GABAPENTIN 300 MG/1
300 CAPSULE ORAL 2 TIMES DAILY
COMMUNITY

## 2020-01-27 RX ORDER — POLYETHYLENE GLYCOL 3350 17 G/17G
17 POWDER, FOR SOLUTION ORAL DAILY PRN
COMMUNITY

## 2020-01-27 RX ORDER — IODIXANOL 320 MG/ML
115 INJECTION, SOLUTION INTRAVASCULAR ONCE
Status: COMPLETED | OUTPATIENT
Start: 2020-01-27 | End: 2020-01-27

## 2020-01-27 RX ORDER — BUDESONIDE AND FORMOTEROL FUMARATE DIHYDRATE 160; 4.5 UG/1; UG/1
2 AEROSOL RESPIRATORY (INHALATION) 2 TIMES DAILY
COMMUNITY

## 2020-01-27 RX ORDER — SERTRALINE HYDROCHLORIDE 100 MG/1
100 TABLET, FILM COATED ORAL DAILY
COMMUNITY

## 2020-01-27 RX ORDER — TIZANIDINE 4 MG/1
4 TABLET ORAL EVERY 6 HOURS PRN
COMMUNITY

## 2020-01-27 RX ADMIN — IODIXANOL 115 ML: 320 INJECTION, SOLUTION INTRAVASCULAR at 16:20

## 2020-01-27 NOTE — H&P
H&P Update    I have reviewed the history and physical (2020) and examined the patient and find no relevant changes. I have reviewed with the patient and/or family the risks, benefits, and alternatives to the procedure. Pre-sedation Assessment    Patient:  Kostas Beltran   :   1945  Intended Procedure:  Abdominal aortogram with lower extremity evaluation and possible intervention. Jesus Garvin nurses notes reviewed and agreed. Medications reviewed  Allergies: Allergies   Allergen Reactions    Cefazolin Anaphylaxis    Cephalosporins Anaphylaxis    Ciprofloxacin Itching     Severe itching 100 min after first dose. 18    Penicillins Anaphylaxis    Atorvastatin Other (See Comments)     Patient tolerates rosuvastatin       Muscle pain    Pregabalin      \"went nuts\"  \"went nuts\"           Pre-Procedure Assessment/Plan:  ASA 3 - Patient with moderate systemic disease with functional limitations  Malampati 3    Level of Sedation Plan: Moderate sedation    Post Procedure plan: Return to same level of care      Signed:  Eliezer Vallecillo II, 2020, 3:06 PM

## 2020-01-28 NOTE — OP NOTE
[address]                     [address] [facility], [address] [facility]                                OPERATIVE REPORT    PATIENT NAME: Pa Kemp                  :        1945  MED REC NO:   [id number]                          ROOM:  ACCOUNT NO:   [de-identified]                           ADMIT DATE: 2020  PROVIDER:     Shyanne Zheng Ii, MD    DATE OF PROCEDURE:  2020    PREPROCEDURE DIAGNOSIS:  Right lower extremity ulceration. POSTPROCEDURE DIAGNOSIS:  Right lower extremity ulceration. PROCEDURE:  1. Ultrasound-guided left common femoral artery access. 2.  Abdominal aortogram with bilateral lower extremity runoff. 3.  Antegrade access right superficial femoral artery with ultrasound  guidance. 4.  Right posterior tibial atherectomy ([employer] 1.25 mm). 5.  Right posterior tibial PTA ([place] 2.5 x 150). SURGEON:  Karen Soares MD    ANESTHESIA:  Local/IV. ESTIMATED BLOOD LOSS:  Minimal.    COMPLICATIONS:  None. MEDICATIONS:  The patient is a 72-year-old male with a known history of  peripheral vascular disease. He has a right second digit ulceration and  presents for angiographic evaluation. All risks, benefits, and  alternatives were discussed in detail. All questions were answered. PROCEDURE:  After witnessed informed consent was obtained, the patient  was brought to the cath lab where IV sedation was administered. Bilateral groins were carefully prepped and draped. Ultrasound was used  to identify the left common femoral artery which was noted to be widely  patent. An image was saved to the patient's permanent medical record  and under direct visualization, it was noted to be patent and accessed  with a 4-[place] micropuncture needle. Bentson wire was introduced and a  5-[place] sheath was placed. An Omni Flush catheter was inserted at the  level of renal arteries and an abdominal aortogram was performed. atherosclerosis. Popliteal is patent. There is a patent anterior tibial; however, it does occlude just before  the ankle with significant collaterals in this location. The peroneal  is patent into the foot. The posterior tibial artery is occluded. 3.  Left lower extremity runoff:  Left common femoral and profunda are  patent. SFA is patent. Popliteal is patent. There is limited tibial  evaluation secondary to contrast load. PLAN:  The patient underwent successful intervention of a complete  occlusion of the right posterior tibial artery with atherectomy and  balloon angioplasty, has two-vessel flow into the right foot that  includes the peroneal and the posterior tibial.  The anterior tibial  does occlude just proximal to the ankle with significant malleolar  collateralization seen. We will continue with ongoing wound care and  follow up in the office to monitor for improvement of symptoms.         Arina Kelly MD    D: 01/27/2020 16:25:31       T: 01/27/2020 16:29:02     [email or web address]  Job#: [id number]     Doc#: [id number]    CC: